# Patient Record
Sex: FEMALE | Race: BLACK OR AFRICAN AMERICAN | NOT HISPANIC OR LATINO | Employment: UNEMPLOYED | URBAN - METROPOLITAN AREA
[De-identification: names, ages, dates, MRNs, and addresses within clinical notes are randomized per-mention and may not be internally consistent; named-entity substitution may affect disease eponyms.]

---

## 2018-10-22 ENCOUNTER — TELEPHONE (OUTPATIENT)
Dept: SPINE | Facility: CLINIC | Age: 48
End: 2018-10-22

## 2018-10-22 NOTE — TELEPHONE ENCOUNTER
Does not appear she's ever been seen here at [facility]. She should call pain management and make appointment if she is interested in [facility].

## 2018-10-22 NOTE — TELEPHONE ENCOUNTER
----- Message from Tran Ornelas sent at 10/22/2018  2:42 PM CDT -----  Contact: Patient herself            Name of Who is Calling:   Patient herself      What is the request in detail:  Patient called requesting back at with more information on [employer].  Please give a call back at your earliest convenience.      THANKS!      Can the clinic reply by [facility]: No      What Number to Call Back if not in [facility]: Patient / Ph# [phone]

## 2018-10-23 NOTE — TELEPHONE ENCOUNTER
Contacted Ms. Heron regarding the request we received about a new patient appointment.    stated she was not available at this time.     Spine services was informed that Pain Management providers are not accepting any NEW medicaid patients at this time.